# Patient Record
(demographics unavailable — no encounter records)

---

## 2024-11-04 NOTE — ASSESSMENT
[FreeTextEntry1] : 1. HTN 2. Snoring 3. Salt indiscretion. 4. Abnormal stress test, abnormal coronary CT Cath with stenosis - being medically managed as he is asymptomatic. 5. Dry cough 6. Periphearl neuopathy   Plan: 1. Remains asymptomatic without angina and has excellent exercise tolerance 2. BP and HR well controlled, ECG is stable 3. Continue current meds 4. Labwork next visit in Summer of 2024 5. Given unrevascularized CAD, will continue xarelto 2.5mg BID -   6.  Return in june for labs and office visit

## 2024-11-04 NOTE — PHYSICAL EXAM
[General Appearance - Well Developed] : well developed [Normal Appearance] : normal appearance [Well Groomed] : well groomed [General Appearance - Well Nourished] : well nourished [No Deformities] : no deformities [General Appearance - In No Acute Distress] : no acute distress [Normal Conjunctiva] : the conjunctiva exhibited no abnormalities [Eyelids - No Xanthelasma] : the eyelids demonstrated no xanthelasmas [Normal Oral Mucosa] : normal oral mucosa [No Oral Pallor] : no oral pallor [No Oral Cyanosis] : no oral cyanosis [Normal Jugular Venous A Waves Present] : normal jugular venous A waves present [Normal Jugular Venous V Waves Present] : normal jugular venous V waves present [No Jugular Venous Griggs A Waves] : no jugular venous griggs A waves [Respiration, Rhythm And Depth] : normal respiratory rhythm and effort [Exaggerated Use Of Accessory Muscles For Inspiration] : no accessory muscle use [Auscultation Breath Sounds / Voice Sounds] : lungs were clear to auscultation bilaterally [Heart Rate And Rhythm] : heart rate and rhythm were normal [Heart Sounds] : normal S1 and S2 [Murmurs] : no murmurs present [Abdomen Soft] : soft [Abdomen Tenderness] : non-tender [Abdomen Mass (___ Cm)] : no abdominal mass palpated [Abnormal Walk] : normal gait [Gait - Sufficient For Exercise Testing] : the gait was sufficient for exercise testing [Nail Clubbing] : no clubbing of the fingernails [Cyanosis, Localized] : no localized cyanosis [Petechial Hemorrhages (___cm)] : no petechial hemorrhages [Skin Color & Pigmentation] : normal skin color and pigmentation [] : no rash [No Venous Stasis] : no venous stasis [Skin Lesions] : no skin lesions [No Skin Ulcers] : no skin ulcer [No Xanthoma] : no  xanthoma was observed [Oriented To Time, Place, And Person] : oriented to person, place, and time [Affect] : the affect was normal [Mood] : the mood was normal [No Anxiety] : not feeling anxious

## 2024-11-04 NOTE — REASON FOR VISIT
[Follow-Up - Clinic] : a clinic follow-up of [FreeTextEntry2] : CAD [FreeTextEntry1] : 11/4/2024  Since last visit patient reports...  10/19/2023 Doing well no CP or SOB Staying active Seen by pulm for mild ASHISH - NTD doing well on xarelto 2.5mg BID for unrevasc CAD   4/13/2023  He is not exercising much He is able to walk a lot at work No chest pain  No angina Breathing is ok  No leg swelling No blood in the urine or the stool  he snores at night, wife thinks he has sleep apnea, no sleep study done No labs recently   He has some numbness at the balls of the feet (neuropathy?)  Ongoing for a few months.   He has a dry cough for 6 months, it comes and goes.  He works at [employer].  Perhaps there is environmental exposure. He used to have this with allergy season.   No fevers or chillls    Meds: HCTZ 12.5mg  Losartan 50mg  Aspirin 81mg daily  Crestor 20mg daily  Vitamin D 1000 daily  Zetia 10mg daily   3/17/2022  Working from home Was in [place], walking on the beach , he was able to walk 30 minutes per day No symptoms of chest  He has a  - calesthenics, situps, machines, No symptoms.  Heart rate goes up, breaks a sweat, no symptoms  no leg swelling.  Meds: HCTZ 12.5mg  Losartan 50mg  Aspirin 81mg daily  Crestor 20mg daily  Vitamin D 1000 daily   BP is 136/90 BP at home 130s systolic.